# Patient Record
Sex: MALE | Race: WHITE | NOT HISPANIC OR LATINO | Employment: OTHER | URBAN - METROPOLITAN AREA
[De-identification: names, ages, dates, MRNs, and addresses within clinical notes are randomized per-mention and may not be internally consistent; named-entity substitution may affect disease eponyms.]

---

## 2024-09-03 ENCOUNTER — APPOINTMENT (OUTPATIENT)
Dept: RADIOLOGY | Facility: MEDICAL CENTER | Age: 84
End: 2024-09-03
Attending: EMERGENCY MEDICINE
Payer: COMMERCIAL

## 2024-09-03 ENCOUNTER — HOSPITAL ENCOUNTER (EMERGENCY)
Facility: MEDICAL CENTER | Age: 84
End: 2024-09-03
Attending: EMERGENCY MEDICINE
Payer: COMMERCIAL

## 2024-09-03 VITALS
SYSTOLIC BLOOD PRESSURE: 164 MMHG | HEART RATE: 83 BPM | WEIGHT: 180 LBS | TEMPERATURE: 98 F | HEIGHT: 70 IN | BODY MASS INDEX: 25.77 KG/M2 | DIASTOLIC BLOOD PRESSURE: 71 MMHG | RESPIRATION RATE: 18 BRPM | OXYGEN SATURATION: 95 %
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
## 2024-09-03 DIAGNOSIS — S01.21XA COMPLEX LACERATION OF NOSE, INITIAL ENCOUNTER: ICD-10-CM

## 2024-09-03 DIAGNOSIS — S02.2XXA CLOSED FRACTURE OF NASAL BONE, INITIAL ENCOUNTER: ICD-10-CM

## 2024-09-03 PROCEDURE — 70486 CT MAXILLOFACIAL W/O DYE: CPT

## 2024-09-03 PROCEDURE — 70450 CT HEAD/BRAIN W/O DYE: CPT

## 2024-09-03 PROCEDURE — 304999 HCHG REPAIR-SIMPLE/INTERMED LEVEL 1

## 2024-09-03 PROCEDURE — 303353 HCHG DERMABOND SKIN ADHESIVE

## 2024-09-03 PROCEDURE — 99284 EMERGENCY DEPT VISIT MOD MDM: CPT

## 2024-09-03 PROCEDURE — 700101 HCHG RX REV CODE 250: Performed by: EMERGENCY MEDICINE

## 2024-09-03 RX ADMIN — Medication 3 ML: at 11:39

## 2024-09-03 NOTE — ED TRIAGE NOTES
"Chief Complaint   Patient presents with    GLF     Pt tripped on a curb resulting in a facial injury, significantly bleeding from nose at site. Bleeding under control at this time. Left knee pain with a small abrasion on lower leg. Mild HA, no n/v, no confusion, pt remembers event, negative NIH. No blood thinners.      History of nasal fractures.   Abrasion over left eye. Small abrasion below left eye.     BP (!) 145/71   Pulse 66   Temp 36.7 °C (98 °F) (Temporal)   Resp 16   Ht 1.778 m (5' 10\")   Wt 81.6 kg (180 lb)   SpO2 94%   BMI 25.83 kg/m²     "

## 2024-09-03 NOTE — ED PROVIDER NOTES
"  ER Provider Note    Scribed for Coleman Ness M.D. by Mague Garay. 9/3/2024   11:10 AM    Primary Care Provider: No primary care provider on file.    CHIEF COMPLAINT  Chief Complaint   Patient presents with    GLF     Pt tripped on a curb resulting in a facial injury, significantly bleeding from nose at site. Bleeding under control at this time. Left knee pain with a small abrasion on lower leg. Mild HA, no n/v, no confusion, pt remembers event, negative NIH. No blood thinners.      EXTERNAL RECORDS REVIEWED      HPI/ROS  LIMITATION TO HISTORY   Select: : None  OUTSIDE HISTORIAN(S):  None    Agustin Ashley is a 84 y.o. male who presents to the ED for evaluation of facial injury secondary to tripping while stepping onto the curb. He presents with pain and bleeding from his nose. He denies any neck pain or extremity pain. He denies any loss of consciousness. No alleviating or exacerbating factors were noted.     PAST MEDICAL HISTORY  History reviewed. No pertinent past medical history.    SURGICAL HISTORY  History reviewed. No pertinent surgical history.    FAMILY HISTORY  History reviewed. No pertinent family history.    SOCIAL HISTORY   reports that he has never smoked. He has never used smokeless tobacco. He reports current alcohol use. He reports that he does not use drugs.    CURRENT MEDICATIONS  Previous Medications    No medications on file       ALLERGIES  No Known Allergies     PHYSICAL EXAM  BP (!) 145/71   Pulse 66   Temp 36.7 °C (98 °F) (Temporal)   Resp 16   Ht 1.778 m (5' 10\")   Wt 81.6 kg (180 lb)   SpO2 94%   BMI 25.83 kg/m²      Nursing note and vitals reviewed.  Constitutional: Well-developed and well-nourished. No distress.   HENT: Tenderness and swelling over nasal bridge. Superficial laceration noted.   Neck: No Cspine tenderness.   Eyes: Pupils are equal, round, and reactive to light. Conjunctiva are normal.   Cardiovascular: Normal rate and regular rhythm. No murmur heard. Normal " radial pulses.  Pulmonary/Chest: Breath sounds normal. No wheezes or rales.   Abdominal: Soft and non-tender. No distention    Musculoskeletal: Extremities exhibit normal range of motion without edema or tenderness.   Extremities: Atraumatic.   Neurological: Awake, alert and oriented to person, place, and time. No focal deficits noted.  Skin: Skin is warm and dry. No rash.   Psychiatric: Normal mood and affect. Appropriate for clinical situation    DIAGNOSTIC STUDIES    Radiology:   This attending emergency physician has independently interpreted the diagnostic imaging associated with this visit and is awaiting the final reading from the radiologist.   Preliminary interpretation is a follows: CT scan demonstrates nasal bone fracture.  CT head shows no intracranial hemorrhage.    Radiologist interpretation:   CT-MAXILLOFACIAL W/O PLUS RECONS   Final Result      Impacted and displaced nasal fracture with leftward deviation and soft tissue injury.      CT-HEAD W/O   Final Result      1.  No evidence of acute intracranial process.      2.  Cerebral atrophy as well as periventricular chronic small vessel ischemic change.                  Laceration Repair Procedure Note    Indication: Laceration    Procedure: The patient was placed in the appropriate position and anesthesia around the laceration was obtained by infiltration using LET gel. The area was then draped in a sterile fashion. The laceration was closed with Dermabond. There were no additional lacerations requiring repair. The wound area was then dressed with a sterile dressing.      Total repaired wound length: Irregular, nonlinear, 2.5 cm.     Other Items: None    The patient tolerated the procedure well.  Good wound edge approximation achieved.    Complications: None      INITIAL ASSESSMENT AND PLAN    11:10 AM - Patient was evaluated at bedside. Ordered for CT-Maxillofacial w/o and CT-Head w/o to evaluate. Patient verbalizes understanding and support with my  plan of care.  Patient will undergo evaluation for traumatic injury.     12:01 PM - Laceration repair performed as described above. I discussed plan for discharge and follow up as outlined below. The patient is stable for discharge at this time and will return for any new or worsening symptoms. The patient was given an opportunity to ask questions. The patient verbalizes understanding and support with my plan for discharge.      ED Observation Status? No; Patient does not meet criteria for ED Observation.      COURSE AND MEDICAL DECISION MAKING    Patient presents today after a fall.  CT head shows no intracranial hemorrhage.  CT face shows nasal bone fracture.  The patient notes that he has broken his nose multiple times in the past.    DISPOSITION AND DISCUSSIONS    I have discussed management of the patient with the following physicians and MASSIEL's:  None    Discussion of management with other QHP or appropriate source(s): None     I considered prescribing narcotic pain medication, however I feel pain should be adequately controlled with over the counter NSAIDs.      Barriers to care at this time, including but not limited to: Patient does not have established PCP.     The patient will return for new or worsening symptoms and is stable at the time of discharge.    The patient is referred to a primary physician for blood pressure management, diabetic screening, and for all other preventative health concerns.    DISPOSITION:  Patient will be discharged home in stable condition.    FOLLOW UP:  [facility], Emergency Dept  [address]  [place] [phone]    If symptoms worsen      FINAL DIAGNOSIS  1. Fall, initial encounter    2. Closed fracture of nasal bone, initial encounter    3. Complex laceration of nose, initial encounter       I, Mague Garay (Scribe), am scribing for, and in the presence of, Coleman Ness M.D..    Electronically signed by: Mague  Jrarod (Scribe), 9/3/2024    IColeman M.D. personally performed the services described in this documentation, as scribed by Mague Garay in my presence, and it is both accurate and complete.      The note accurately reflects work and decisions made by me.  Coleman Ness M.D.  9/3/2024  12:27 PM

## 2024-09-03 NOTE — ED NOTES
Dc instructions  discussed with patient at bedside. All questions answered at this time. VSS. Pt to lobby without incident. [employer] per Case Management to drive patient home.

## 2024-09-03 NOTE — DISCHARGE PLANNING
Anticipated Discharge Disposition: home    Action: Patient missed first ride. New [employer] is [employer]  [place] [id number]    Barriers to Discharge: Transport ETA. Pt not noticing cell texts    Plan: Will cont to follow

## 2024-09-03 NOTE — DISCHARGE PLANNING
Anticipated Discharge Disposition: home    Action: Requested to set up ride to home. [employer] car [id number] ETA 8 min to home. Cell should be getting texts      Barriers to Discharge: none    Plan: no further ER CM needs

## 2024-09-03 NOTE — ED NOTES
Spoke to Radha in Case management regarding ride home for pt. Plan to provide [employer] for ride home.